# Patient Record
Sex: MALE | Race: WHITE | NOT HISPANIC OR LATINO | Employment: FULL TIME | URBAN - NONMETROPOLITAN AREA
[De-identification: names, ages, dates, MRNs, and addresses within clinical notes are randomized per-mention and may not be internally consistent; named-entity substitution may affect disease eponyms.]

---

## 2017-04-25 ENCOUNTER — HOSPITAL ENCOUNTER (EMERGENCY)
Facility: HOSPITAL | Age: 37
Discharge: HOME | End: 2017-04-25
Admitting: EMERGENCY MEDICINE

## 2017-04-25 VITALS
SYSTOLIC BLOOD PRESSURE: 123 MMHG | WEIGHT: 251 LBS | BODY MASS INDEX: 32.21 KG/M2 | RESPIRATION RATE: 16 BRPM | DIASTOLIC BLOOD PRESSURE: 83 MMHG | TEMPERATURE: 98 F | OXYGEN SATURATION: 97 % | HEIGHT: 74 IN | HEART RATE: 81 BPM

## 2017-04-25 DIAGNOSIS — I10 ESSENTIAL HYPERTENSION: Primary | ICD-10-CM

## 2017-04-25 PROCEDURE — 99282 EMERGENCY DEPT VISIT SF MDM: CPT

## 2017-04-25 RX ORDER — ASPIRIN 81 MG/1
81 TABLET, CHEWABLE ORAL DAILY
COMMUNITY

## 2017-04-25 RX ORDER — LISINOPRIL 10 MG/1
10 TABLET ORAL DAILY
Qty: 10 TABLET | Refills: 0 | Status: SHIPPED | OUTPATIENT
Start: 2017-04-25 | End: 2017-05-05

## 2017-04-25 NOTE — ED PROVIDER NOTES
Subjective   HPI Comments: Patient is a 36-year-old male who presents ER today with complaint of high blood pressure.  Patient reports that he lost his insurance proximally one half years ago and has not been on his blood pressure medicines for approximately 3 months.  He states he was able to stretch out his medications to last him a while however he has not been on anything for the past several months.  The patient reports that he has not taken his blood pressure with past several months however he has had a headache for about 2 weeks has been jawline nature.  He states this was similar to his headaches when he had high blood pressure.  Patient denies any head trauma, he denies any visual changes.  He denies any thunderclap headache or this being the worst headache of his life.  The patient also reports that he has been feeling short of breath immediately after he wakes up from sleep for the past several months as well since.  His blood pressure medicine.  He denies any chest pain with this.  Denies any shortness of breath at this time.  He states he would like a refill of a blood pressure medication.  He was previously on Benicar however he states he is not able to afford this and is it is several $100 a month for him.  He presents with her spouse today for further evaluation.    Patient is a 36 y.o. male presenting with hypertension.   History provided by:  Patient   used: No    Hypertension   Severity:  Mild  Onset quality:  Gradual  Duration:  3 months  Timing:  Constant  Progression:  Unchanged  Chronicity:  Chronic  Time since last dose of antihypertensive:  3 months  Notable PTA blood pressures:  Unknown  Context: normal sodium, not caffeine, not drug abuse, not herbal remedies, not medication change, not noncompliance, not OTC medications used and not stress    Relieved by:  Angiotensin blockers  Worsened by:  Nothing  Ineffective treatments:  None tried  Associated symptoms: no  abdominal pain, no anxiety, no blurred vision, no chest pain, no confusion, no dizziness, no ear pain, no epistaxis, no fatigue, no fever, no headaches, no hematuria, no hypokalemia, no loss of consciousness, no nausea, no neck pain, no palpitations, no peripheral edema, no shortness of breath, no syncope, no tinnitus, not vomiting and no weakness    Risk factors: no alcohol use, no cardiac disease, no cocaine use, no decongestant use, no diabetes, no family history of hypertension, no kidney disease, no obesity, no prior aneurysm, no prior stroke, no PVD and no tobacco use        Review of Systems   Constitutional: Negative for fatigue and fever.   HENT: Negative for ear pain, nosebleeds and tinnitus.    Eyes: Negative for blurred vision.   Respiratory: Negative for shortness of breath.    Cardiovascular: Negative for chest pain, palpitations and syncope.   Gastrointestinal: Negative for abdominal pain, nausea and vomiting.   Genitourinary: Negative for hematuria.   Musculoskeletal: Negative for neck pain.   Neurological: Negative for dizziness, loss of consciousness, weakness and headaches.   Psychiatric/Behavioral: Negative for confusion. The patient is not nervous/anxious.    All other systems reviewed and are negative.      Past Medical History:   Diagnosis Date   • Hypertension        No Known Allergies    Past Surgical History:   Procedure Laterality Date   • APPENDECTOMY         History reviewed. No pertinent family history.    Social History     Social History   • Marital status: Single     Spouse name: N/A   • Number of children: N/A   • Years of education: N/A     Social History Main Topics   • Smoking status: Former Smoker     Quit date: 4/25/2015   • Smokeless tobacco: None   • Alcohol use No   • Drug use: No   • Sexual activity: Not Asked     Other Topics Concern   • None     Social History Narrative   • None           Objective   Physical Exam   Constitutional: He is oriented to person, place, and  time. He appears well-developed and well-nourished.   HENT:   Head: Normocephalic and atraumatic.   Eyes: Conjunctivae are normal. Pupils are equal, round, and reactive to light.   Neck: Normal range of motion. Neck supple.   Cardiovascular: Normal rate, regular rhythm and normal heart sounds.    Pulmonary/Chest: Effort normal and breath sounds normal.   Abdominal: Soft. Bowel sounds are normal.   Musculoskeletal: Normal range of motion.   Neurological: He is alert and oriented to person, place, and time.   Skin: Skin is warm and dry.   Psychiatric: He has a normal mood and affect.   Nursing note and vitals reviewed.      Procedures         ED Course  ED Course   Comment By Time   At this time we will go ahead and start patient on lisinopril.  Advised that he will need to follow-up with a primary care provider for further management of this.  CMP, the ER  has met with the patient has provided the patient was to primary care providers that he can contact.  At this time patient will be discharged home in stable condition.  He declines any testing today for his headache for for his shortness of breath.  He states he just wants prescription for blood pressure medication.  At this time he will be discharged home in stable condition.  Asked return to the ER if any new or worsening symptoms NERI Cabrera 04/25 1058                  MDM  Number of Diagnoses or Management Options  Essential hypertension: new and requires workup  Patient Progress  Patient progress: stable      Final diagnoses:   Essential hypertension            NERI Cabrera  04/25/17 1100

## 2018-03-21 ENCOUNTER — HOSPITAL ENCOUNTER (OUTPATIENT)
Dept: HOSPITAL 58 - RAD | Age: 38
Discharge: HOME | End: 2018-03-21
Attending: INTERNAL MEDICINE

## 2018-03-21 DIAGNOSIS — N52.8: ICD-10-CM

## 2018-03-21 DIAGNOSIS — N50.819: Primary | ICD-10-CM

## 2018-03-21 DIAGNOSIS — I10: ICD-10-CM

## 2018-03-21 PROCEDURE — 80053 COMPREHEN METABOLIC PANEL: CPT

## 2018-03-21 PROCEDURE — 85025 COMPLETE CBC W/AUTO DIFF WBC: CPT

## 2018-03-21 PROCEDURE — 36415 COLL VENOUS BLD VENIPUNCTURE: CPT

## 2018-03-22 NOTE — US
Exam:  Gray-scale and color Doppler ultrasonographic evaluation of the testicles and scrotum. 

  

Comparison:  None available. 

  

Reason for exam:  Testicular pain. 

  

FINDINGS:  The right testicle measures approximately 4.93 x 2.06 x 2.62 cm with a normal appearing ec
hotexture and normal vascular flow.  No parenchymal mass lesion is seen in the right testicle. 

  

The right epididymis appears grossly unremarkable.  The right epididymal head measures 0.62 cm 

  

The right scrotal wall measures 0.68 cm. 

  

There is a small right-sided hydrocele. 

  

The left testicle measures approximately 3.62 x 1.91 x 3.16 cm with normal appearing echotexture and 
normal vascular flow.  No parenchymal mass lesion is seen in the left testicle. 

  

The left epididymal head measures 1.46 cm with a 0.2 cm epididymal cyst. 

  

The left scrotal wall measures 0.58 cm. 

  

There is a small left-sided hydrocele. 

  

There is minimally increased vascularity seen in the left epididymis when compared to the right epidi
dymis. 

  

Impression: 

1.  No evidence of testicular torsion or parenchymal mass lesion is seen in either testicle. 

2.  The left epididymal head is mildly prominent with slightly increased vascularity when compared to
 the right.  Imaging findings may represent normal physiologic differences versus mild left sided epi
didymitis. 

3.  Left epididymal cyst measuring 0.2 cm. 

4.  Small bilateral hydroceles.

## 2018-04-21 ENCOUNTER — HOSPITAL ENCOUNTER (EMERGENCY)
Dept: HOSPITAL 58 - ED | Age: 38
LOS: 1 days | Discharge: HOME | End: 2018-04-22

## 2018-04-21 VITALS — DIASTOLIC BLOOD PRESSURE: 81 MMHG | SYSTOLIC BLOOD PRESSURE: 130 MMHG | TEMPERATURE: 97.1 F

## 2018-04-21 VITALS — BODY MASS INDEX: 33 KG/M2

## 2018-04-21 DIAGNOSIS — I10: ICD-10-CM

## 2018-04-21 DIAGNOSIS — R00.2: Primary | ICD-10-CM

## 2018-04-21 DIAGNOSIS — R06.02: ICD-10-CM

## 2018-04-21 DIAGNOSIS — R53.1: ICD-10-CM

## 2018-04-21 PROCEDURE — 82550 ASSAY OF CK (CPK): CPT

## 2018-04-21 PROCEDURE — 84484 ASSAY OF TROPONIN QUANT: CPT

## 2018-04-21 PROCEDURE — 81001 URINALYSIS AUTO W/SCOPE: CPT

## 2018-04-21 PROCEDURE — 93010 ELECTROCARDIOGRAM REPORT: CPT

## 2018-04-21 PROCEDURE — 87086 URINE CULTURE/COLONY COUNT: CPT

## 2018-04-21 PROCEDURE — 84443 ASSAY THYROID STIM HORMONE: CPT

## 2018-04-21 PROCEDURE — 85379 FIBRIN DEGRADATION QUANT: CPT

## 2018-04-21 PROCEDURE — 80306 DRUG TEST PRSMV INSTRMNT: CPT

## 2018-04-21 PROCEDURE — 82553 CREATINE MB FRACTION: CPT

## 2018-04-21 PROCEDURE — 93005 ELECTROCARDIOGRAM TRACING: CPT

## 2018-04-21 PROCEDURE — 80053 COMPREHEN METABOLIC PANEL: CPT

## 2018-04-21 PROCEDURE — 99283 EMERGENCY DEPT VISIT LOW MDM: CPT

## 2018-04-21 PROCEDURE — 85025 COMPLETE CBC W/AUTO DIFF WBC: CPT

## 2018-04-21 PROCEDURE — 36415 COLL VENOUS BLD VENIPUNCTURE: CPT

## 2018-04-21 NOTE — ED.PDOC
General


ED Provider: 


Dr. ROMY JASSO





Chief Complaint: Palpitations


Stated Complaint: Been having palpitations on and off, they just come  and go. 

no chest pain Has shortness of breath.


Time Seen by Physician: 23:28


Mode of Arrival: Walk-In


Information Source: Patient


Primary Care Provider: 


ROMY JASSO-[facility]





Nursing and Triage Documentation Reviewed and Agree: Yes


Reviewed sepsis parameters & appropriate labs ordered?: No


System Inflammatory Response Syndrome: Not Applicable


Sepsis Protocol: 


For patient's 13 years and over:





Temp is 96.8 and below  and greater


Pulse >90 BPM


Resp >20/minute


Acutely Altered Mental Status





Are patient's symptoms suggestive of a new infection, such as:


   -Pneumonia


   -Skin, Soft Tissue


   -Endocarditis


   -UTI


   -Bone, Joint Infection


   -Implantable Device


   -Acute Abdominal Infection


   -Wound Infection


   -Meningitis


   -Blood Stream Catheter Infection


   -Unknown








Cardiovascular Complaint Exam





- Palpitations Complaint/Exam


Symptoms Are: Still present


Timing: Constant


Initial Severity: Moderate


Character: Reports: Fast, Pounding


Aggravating: Reports: Rest


Alleviating: Reports: None


Associated Signs and Symptoms: Reports: Shortness of breath.  Denies: 

Lightheadedness, Dizziness, Syncope, Chest pain, Diaphoresis, Nausea, Vomiting


Related History: Similar episode


Related Surgical History: Reports: None


Cardiac Risk Factors: Reports: Hypertension, Family history


Pulmonary Embolism Risk Factors: Reports: None


Atrial Fibrillation Risk Factors: Reports: None


Thyroid Exam: Normal


Differential Diagnoses: Medication induced, Panic Disorder, Hyperventilation


Quality Indicators for AMI: EKG in 10min.





Review of Systems





- Review Of Systems


Constitutional: Reports: Malaise, Weakness


Eyes: Reports: No symptoms


Ears, Nose, Mouth, Throat: Reports: No symptoms


Respiratory: Reports: Short of air


Cardiac: Reports: Palpitations


GI: Reports: No symptoms


: Reports: No symptoms


Musculoskeletal: Reports: No symptoms


Skin: Reports: No symptoms


Neurological: Reports: No symptoms


Endocrine: Reports: No symptoms


Hematologic/Lymphatic: Reports: No symptoms


All Other Systems: Reviewed and Negative





Past Medical History





- Past Medical History


Previously Healthy: Yes


Endocrine: Reports: None


Cardiovascular: Reports: Hypertension


Respiratory: Reports: None


Hematological: Reports: None


Gastrointestinal: Reports: None


Genitourinary: Reports: None


Neuro/Psych: Reports: Anxiety


Musculoskeletal: Reports: None


Cancer: Reports: None





- Surgical History


General Surgical History: Reports: None





- Family History


Family History: Reports: None





- Social History


Smoking Status: Former smoker


Hx Substance Use: No


Alcohol Screening: None





- Immunizations


Tetanus Shot up to Date: Yes





Physical Exam





- Physical Exam


Appearance: Well-appearing, No pain distress, Well-nourished


Eyes: JA, EOMI, Conjunctiva clear


ENT: Ears normal, Nose normal, Oropharynx normal


Respiratory: Airway patent, Breath sounds clear, Breath sounds equal, 

Respirations nonlabored


Cardiovascular: RRR, Pulses normal, No rub, No murmur


GI/: Soft, Nontender, No masses, Bowel sounds normal, No Organomegaly


Musculoskeletal: Normal strength, ROM intact, No edema, No calf tenderness


Skin: Warm, Dry, Normal color


Neurological: Sensation intact, Motor intact, Reflexes intact, Cranial nerves 

intact, Alert, Oriented


Psychiatric: Affect appropriate, Mood appropriate





Interpretation





- EKG Interpretation


Time of EKG #1: 23:45


Rate: Normal


Rhythm: Sinus


Ectopy: None


Axis: NL





Critical Care Note





- Critical Care Note


Total Time (mins): 30





Course





- Course


Hematology/Chemistry: 


 04/21/18 23:30





Orders, Labs, Meds: 


Lab Review











  04/21/18





  23:30


 


WBC  9.24


 


RBC  4.25 L


 


Hgb  12.9 L


 


Hct  36.6 L


 


MCV  86.1


 


MCH  30.4


 


MCHC  35.2


 


RDW Coeff of José Miguel  11.8


 


Plt Count  210


 


Immature Gran % (Auto)  0.3


 


Neut % (Auto)  64.9


 


Lymph % (Auto)  23.2


 


Mono % (Auto)  9.0


 


Eos % (Auto)  1.8


 


Baso % (Auto)  0.8


 


Immature Gran # (Auto)  0.0


 


Neut # (Auto)  6.0


 


Lymph # (Auto)  2.1


 


Mono # (Auto)  0.8


 


Eos # (Auto)  0.2


 


Baso # (Auto)  0.1








Orders











 Category Date Time Status


 


 EKG-(ED ONLY) Stat CARDIO  04/21/18 23:21 Ordered


 


 CBC W/ AUTO DIFF Stat LAB  04/21/18 23:30 Completed


 


 COMPREHENSIVE METABOLIC PANEL Stat LAB  04/21/18 23:30 Received


 


 CREATINE KINASE Stat LAB  04/21/18 23:30 Received


 


 THYROID STIMULATING HORMONE Stat LAB  04/21/18 23:30 Received


 


 TROPONIN I Stat LAB  04/21/18 23:30 Received


 


 URINALYSIS C & S IF INDICATED Stat LAB  04/21/18 23:50 Received


 


 URINE DRUG SCREEN (RAPID FOR ED) [DRUG SCREEN, URINE, LAB  04/21/18 23:50 

Received





 RAPID] Stat   











Vital Signs: 


 











  Temp Pulse Resp BP Pulse Ox


 


 04/21/18 23:05  97.1 F L  78  16  130/81  94 L














LEONID Risk Score


LEONID Risk Score: 


Risk Score      Odds of death by 30D


      0                 0.1 (0.1-0.2)


      1                 0.3 (0.2-0.3)


      2                 0.4 (0.3-0.5)


      3                 0.7 (0.6-0.9)


      4                 1.2 (1.0-1.5)


      5                 2.2 (1.9-2.6)


      6                 3.0 (2.5-3.6)


      7                 4.8 (3.8-6.1)








Departure





- Departure


Time of Disposition: 23:31


Disposition: HOME SELF-CARE


Discharge Problem: 


 Palpitations





Instructions:  Heart Palpitations (ED)


Condition: Stable


Pt referred to PMD for follow-up: Yes


IPMP verified?: No


Additional Instructions: 


No spicy food, No fried food


continue taking medications.


Needs f/u for further evaluation


f/u in [facility] in 3 days








Prescriptions: 


Ranitidine HCl [Zantac] 150 mg PO BIDAC #20 tablet


Sucralfate Susp [Carafate] 1 gm PO ACHS #1 bottle


Allergies/Adverse Reactions: 


Allergies





No Known Allergies Allergy (Verified 04/21/18 23:13)


 








Home Medications: 


Ambulatory Orders





Ranitidine HCl [Zantac] 150 mg PO BIDAC #20 tablet 04/21/18 


Sucralfate Susp [Carafate] 1 gm PO ACHS #1 bottle 04/21/18 








Disposition Discussed With: Patient

## 2018-04-22 NOTE — DI
EXAM:  Two-view chest 

  

HISTORY:  Palpitations 

  

COMPARISON: There are CT scan abdomen pelvis 01/10/2013 

  

FINDINGS:  The cardiomediastinal silhouette is normal.  There is 1.9 cm right lower lobe pulmonary no
dule previously seen on CT.  The left lung is clear.  No osseous abnormalities identified. 

  

IMPRESSION: 

  

No evidence of active pulmonary disease. 

  

Redemonstrated is a right lower lobe pulmonary nodule which merits continued follow-up

## 2018-05-08 ENCOUNTER — HOSPITAL ENCOUNTER (OUTPATIENT)
Dept: HOSPITAL 58 - CAR | Age: 38
Discharge: HOME | End: 2018-05-08
Attending: INTERNAL MEDICINE

## 2018-05-08 VITALS — BODY MASS INDEX: 33 KG/M2

## 2018-05-08 DIAGNOSIS — R06.02: ICD-10-CM

## 2018-05-08 DIAGNOSIS — R07.2: Primary | ICD-10-CM

## 2018-05-10 ENCOUNTER — HOSPITAL ENCOUNTER (OUTPATIENT)
Dept: HOSPITAL 58 - CAR | Age: 38
Discharge: HOME | End: 2018-05-10
Attending: INTERNAL MEDICINE

## 2018-05-10 VITALS — BODY MASS INDEX: 33 KG/M2

## 2018-05-10 DIAGNOSIS — R06.02: Primary | ICD-10-CM

## 2018-05-10 DIAGNOSIS — R07.2: ICD-10-CM

## 2018-05-10 NOTE — STRESS
Date of Test:  5/10/18      Ordering Physician: DR. ROMY GIMENEZ      Occupation:


Reason for Exam: SOB, CHEST PAIN  Smoking History: QUIT 3 YRS AGO  Height: 74" 
        Weight: 260 LBS

Current Medications: LISINOPRIL, RANITIDINE                           

Resting EKG: SINUS RHYTHM/ NO ACUTE CHANGES          Target Heart Rate: 154/182

                                                                               
                                                                        S-T 
SEGMENT





STAGE MPH/GRADE HEART RATE BPM BLOOD PRESSURE MMHG RHYTHM  +/-  ELEVATION 
DEPRESSION SYMPTOMS,COMMENTS

 

AT REST  67 128/70 SR X              NO SYMPTOMS

 

1 1.7/10% 100 142/72 SR X              NO SYMPTOMS

 

2 2.5/12% 122 140/78 SR X              NO SYMPTOMS

 

3 3.4/14% 140 150/80 SR X              NO SYMPTOMS

 

4 4.2/16%                  

 

5 5.0/18%                  

 

Immediately After 

 155  SR X              SHORT OF AIR

 

Minutes Post Exercise 5:00 80 122/68 SR X              NO SYMPTOMS

 



Minutes Post Exercise 

 

 

 

 

 

           

       





 DURATION OF EXERCISE: 9:23               MAXIMUM HEART  RATE REACHED: 155    
REASON FOR TERMINATION: SHORT OF AIR



98% OXYGEN SATURATION WITH EXERCISE ON ROOM AIR                 

INTERPRETATION:

1: NO EVIDENCE OF ISCHEMIA BY ST-T WAVE

2: NO CHEST PAIN OR CHEST DISCOMFORT

3: BLOOD PRESSURE RESPONSE: NORMAL 

4. NO ARRHYTHMIAS

TRACEY LEFT VENTRICULAR CONTRACTILITY--RESTING AND POST EXERCISE

                                                                               
            

MTDD

## 2018-05-10 NOTE — STRESSECHO
Date of Test:  5/10/18    Ordering Physician: DR. ROMY JASSO             
Occupation:

Reason for Exam: CHEST PAIN, SOB       Smoking History: QUIT 3 YRS AGO  Height: 
74"         Weight: 260 LBS

Current Medications: LISINOPRIL, RANITIDINE, CARAFATE                           

Resting EKG: SINUS RHYTHM/ NO ACUTE CHANGES      Target Heart Rate: 154/182

                                                                               
                                                                        S-T 
SEGMENT





STAGE MPH/GRADE HEART RATE BPM BLOOD PRESSURE MMHG RHYTHM  +/-  ELEVATION 
DEPRESSION SYMPTOMS,COMMENTS

 

AT REST  67 128/70 SR X              NO SYMPTOMS

 

1 1.7/10% 100 142/82 SR X              NO SYMPTOMS

 

2 2.5/12% 122 140/78 SR X              NO SYMPTOMS

 

3 3.4/14% 140 150/82 SR X              NO SYMPTOMS

 

4 4.2/16%                  

 

5 5.0/18%                  

 

Immediately After 

 155  SR X              SHORT OF AIR

 

Minutes Post Exercise 5:00 80  122/68 SR X              SHORT OF AIR

 



Minutes Post Exercise 

 

 

 

 

 

           

       





 DURATION OF EXERCISE: 9:23         MAXIMUM HEART  RATE REACHED: 80          
REASON FOR TERMINATION: SHORT OF AIR



98% OXYGEN SATURATION WITH EXERCISE ON ROOM AIR                                
                                                                          

  INTERPRETATION: 

1. NO EVIDENCE OF ISCHEMIA B ST-T WAVE 

2. NO CHEST PAIN OR CHEST DISCOMFORT

3. BLOOD PRESSURE RESPONSE: NORMAL

4. NO ARRHYTHMIAS

NORMAL LEFT VENTRICULAR CONTRACTILITY--RESTING AND POST EXERCISE



MTDD

## 2018-05-11 ENCOUNTER — HOSPITAL ENCOUNTER (OUTPATIENT)
Dept: HOSPITAL 58 - CAR | Age: 38
Discharge: HOME | End: 2018-05-11
Attending: INTERNAL MEDICINE

## 2018-05-11 VITALS — BODY MASS INDEX: 33 KG/M2

## 2018-05-11 DIAGNOSIS — R00.2: Primary | ICD-10-CM

## 2018-05-11 PROCEDURE — 93227 XTRNL ECG REC<48 HR R&I: CPT

## 2018-05-14 NOTE — HOLTER
PATIENT INFORMATION AND COMMENTS

Attending Physician: DR. ROMY JASSO



Indications:  PALPITATIONS



________________________________________________________________________________
__

Patient Medications:  LISINOPRIL, RANITIDINE,  CARAFATE



________________________________________________________________________________
__

Pre-procedure Summary: 



Protocol:  Standard      Heart Rate         

Started: 5/11/18 1317      Minimum: 54 BPM   Weight: 260 LBS   

Ended: 5/12/18 1317      Maximum: 128 BPM   Height: 74"

Duration: 24 HOURS      Average:  70 BPM

________________________________________________________________________________
_

INTERPRETATIONS/OBSERVATIONS:

1. BASIC RHYTHM: SINUS, RATE 54 BPM  BPM

2. RARE PAC'S AND PVC'S

3. NO ST-T WAVE CHANGES FROM BASELINE

4. NO CORRELATION WITH ACTIVITY LOG





MTDD

## 2019-01-11 ENCOUNTER — HOSPITAL ENCOUNTER (OUTPATIENT)
Dept: HOSPITAL 58 - RHC-LAB | Age: 39
Discharge: HOME | End: 2019-01-11
Attending: NURSE PRACTITIONER

## 2019-01-11 VITALS — BODY MASS INDEX: 33 KG/M2

## 2019-01-11 DIAGNOSIS — E78.5: ICD-10-CM

## 2019-01-11 DIAGNOSIS — I10: Primary | ICD-10-CM

## 2019-01-11 PROCEDURE — 36415 COLL VENOUS BLD VENIPUNCTURE: CPT

## 2019-01-11 PROCEDURE — 85025 COMPLETE CBC W/AUTO DIFF WBC: CPT

## 2019-01-11 PROCEDURE — 80053 COMPREHEN METABOLIC PANEL: CPT

## 2019-01-11 PROCEDURE — 80061 LIPID PANEL: CPT

## 2019-02-04 ENCOUNTER — HOSPITAL ENCOUNTER (EMERGENCY)
Dept: HOSPITAL 58 - ED | Age: 39
Discharge: HOME | End: 2019-02-04
Payer: COMMERCIAL

## 2019-02-04 VITALS — SYSTOLIC BLOOD PRESSURE: 136 MMHG | DIASTOLIC BLOOD PRESSURE: 79 MMHG | TEMPERATURE: 97.5 F

## 2019-02-04 VITALS — BODY MASS INDEX: 33.3 KG/M2

## 2019-02-04 DIAGNOSIS — R42: ICD-10-CM

## 2019-02-04 DIAGNOSIS — I10: ICD-10-CM

## 2019-02-04 DIAGNOSIS — E16.2: Primary | ICD-10-CM

## 2019-02-04 PROCEDURE — 82550 ASSAY OF CK (CPK): CPT

## 2019-02-04 PROCEDURE — 84484 ASSAY OF TROPONIN QUANT: CPT

## 2019-02-04 PROCEDURE — 85025 COMPLETE CBC W/AUTO DIFF WBC: CPT

## 2019-02-04 PROCEDURE — 82553 CREATINE MB FRACTION: CPT

## 2019-02-04 PROCEDURE — 93010 ELECTROCARDIOGRAM REPORT: CPT

## 2019-02-04 PROCEDURE — 82962 GLUCOSE BLOOD TEST: CPT

## 2019-02-04 PROCEDURE — 80053 COMPREHEN METABOLIC PANEL: CPT

## 2019-02-04 PROCEDURE — 99283 EMERGENCY DEPT VISIT LOW MDM: CPT

## 2019-02-04 PROCEDURE — 93005 ELECTROCARDIOGRAM TRACING: CPT

## 2019-02-04 PROCEDURE — 81001 URINALYSIS AUTO W/SCOPE: CPT

## 2019-02-04 PROCEDURE — 83036 HEMOGLOBIN GLYCOSYLATED A1C: CPT

## 2019-02-04 PROCEDURE — 36415 COLL VENOUS BLD VENIPUNCTURE: CPT

## 2019-02-04 NOTE — CT
EXAM:  CT scan of the head without contrast 

  

  

HISTORY:  Headaches, nausea 

  

  

TECHNIQUE:  Helical imaging of the head was performed without contrast.  5 mm thin axial images and c
oronal and sagittal images were provided for interpretation. 

  

  

FINDINGS:  The gray-white interface appears normal.  No acute hemorrhages are seen.  There is no mass
 effect.  The basal cisterns are patent.  The paranasal sinuses and mastoid air cells are clear.  The
 calvarium and extracranial soft tissues are normal. 

  

  

IMPRESSION:  No acute intracranial abnormalities are seen.

## 2019-02-04 NOTE — ED.PDOC
General


ED Provider: 


Dr. JERARDO STUART-ER





Chief Complaint: Dizziness


Stated Complaint: i think my bs gets low---i get shaky aNd nauseated and feel 

better when i eat


Time Seen by Physician: 19:30


Mode of Arrival: Walk-In


Information Source: Patient


Exam Limitations: No limitations


Primary Care Provider: 


NARINDER LEGGETT





Nursing and Triage Documentation Reviewed and Agree: Yes


Does patient meet sepsis criteria?: No


System Inflammatory Response Syndrome: Not Applicable


Sepsis Protocol: 


For patient's 13 years and over:





Temp is 96.8 and below  and greater


Pulse >90 BPM


Resp >20/minute


Acutely Altered Mental Status





Are patient's symptoms suggestive of a new infection, such as:


   -Pneumonia


   -Skin, Soft Tissue


   -Endocarditis


   -UTI


   -Bone, Joint Infection


   -Implantable Device


   -Acute Abdominal Infection


   -Wound Infection


   -Meningitis


   -Blood Stream Catheter Infection


   -Unknown








Endocrine Complaint Exam





- Diabetic Complication Complaint/Exam


Onset/Duration: 30 min


Symptoms Are: Resolved


Timing: Intermittent


Initial Severity: Mild


Current Severity: Mild


Character: Lethargic


Aggravating: Reports: Diet change


Alleviating: Reports: Food


Associated Signs and Symptoms: Denies: Decreased LOC, Polydipsia, Polyuria, 

Polyphagia, Weight loss, Abdominal pain, Nausea, Vomiting, Fever, Diaphoresis, 

Fruity breath


Acetone on Breath: No


Dry Mucous Membranes: No


Kussmaul Respirations: No


Glascow Coma Scale (see protocol): 15


Meningeal Signs: No


Focal Weakness: None


Focal Sensory Loss: None


Gait: Normal


Nystagmus Present: No


Gag Reflex Present: Yes


Finger to Nose: Normal


Romberg Test Positive: No


Babinski Sign: Negative Right, Negative Left


Heel to Toe Normal: Yes


Differential Diagnoses: Hypoglycemia





Review of Systems





- Review Of Systems


Constitutional: Reports: No symptoms


Eyes: Reports: No symptoms


Ears, Nose, Mouth, Throat: Reports: No symptoms


Respiratory: Reports: No symptoms


Cardiac: Reports: No symptoms


GI: Reports: No symptoms


: Reports: No symptoms


Musculoskeletal: Reports: No symptoms, Muscle pain


Skin: Reports: No symptoms


Neurological: Reports: No symptoms


Endocrine: Reports: No symptoms


Hematologic/Lymphatic: Reports: No symptoms


All Other Systems: Reviewed and Negative





Past Medical History





- Past Medical History


Previously Healthy: Yes


Endocrine: Reports: None


Cardiovascular: Reports: Hypertension


Respiratory: Reports: None


Hematological: Reports: None


Gastrointestinal: Reports: None


Genitourinary: Reports: None


Neuro/Psych: Reports: Anxiety


Musculoskeletal: Reports: None


Cancer: Reports: None





- Surgical History


General Surgical History: Reports: None





- Family History


Family History: Reports: None





- Social History


Smoking Status: Former smoker


Hx Substance Use: No


Alcohol Screening: None





- Immunizations


Tetanus Shot up to Date: No





Physical Exam





- Physical Exam


Appearance: Well-appearing, No pain distress, Well-nourished


Eyes: JA, EOMI, Conjunctiva clear


ENT: Ears normal, Nose normal, Oropharynx normal


Neck: Supple


Respiratory: Airway patent, Breath sounds clear, Breath sounds equal, 

Respirations nonlabored


Cardiovascular: RRR


GI/: Soft


Musculoskeletal: Normal strength, ROM intact, No edema, No calf tenderness


Skin: Warm, Dry, Normal color


Neurological: Sensation intact, Motor intact, Reflexes intact, Cranial nerves 

intact, Alert, Oriented


Psychiatric: Affect appropriate, Mood appropriate





Interpretation





- Radiology Interpretation


Radiology Interpretation By: Radiologist


Radiology Results: Negative


Exam Interpreted: CT Scan





- EKG Interpretation


Time of EKG #1: 20:26


Rate: Normal


Rhythm: Sinus


Ectopy: None


Axis: NL


ST Segment: Normal


Interpretation: nsr





Critical Care Note





- Critical Care Note


Total Time (mins): 0





Course





- Course


Hematology/Chemistry: 


 02/04/19 19:46





 02/04/19 19:46


Orders, Labs, Meds: 





Lab Review











  02/04/19 02/04/19 02/04/19





  19:37 19:46 19:46


 


WBC   8.03 


 


RBC   4.67 L 


 


Hgb   13.9 L 


 


Hct   39.7 L 


 


MCV   85.0 


 


MCH   29.8 


 


MCHC   35.0 


 


RDW Coeff of José Miguel   11.8 


 


Plt Count   216 


 


Immature Gran % (Auto)   0.2 


 


Neut % (Auto)   66.3 


 


Lymph % (Auto)   24.3 


 


Mono % (Auto)   7.2 


 


Eos % (Auto)   1.1 


 


Baso % (Auto)   0.9 


 


Immature Gran # (Auto)   0.0 


 


Neut # (Auto)   5.3 


 


Lymph # (Auto)   2.0 


 


Mono # (Auto)   0.6 


 


Eos # (Auto)   0.1 


 


Baso # (Auto)   0.1 


 


Sodium    140.6


 


Potassium    4.04


 


Chloride    102.0


 


Carbon Dioxide    26.0


 


Anion Gap    16.64


 


BUN    17.6


 


Creatinine    1.13 H


 


Estimated GFR (MDRD)    72.00


 


BUN/Creatinine Ratio    15.57


 


Glucose    131.1 H


 


Hemoglobin A1c   


 


Calcium    9.37


 


Total Bilirubin    0.63


 


AST    37.7


 


ALT    46.0


 


Alkaline Phosphatase    52.9


 


Total Creatine Kinase    132.0


 


CK-MB (CK-2)    0.556


 


CK-MB (CK-2) %    0.4200


 


Troponin I    < 0.012


 


Total Protein    7.99


 


Albumin    4.79


 


Globulin    3.20


 


Albumin/Globulin Ratio    1.49


 


Urine Color  Yellow  


 


Urine Clarity  Clear  


 


Urine pH  7.0  


 


Ur Specific Gravity  1.010  


 


Urine Protein  Negative  


 


Urine Glucose (UA)  Negative  


 


Urine Ketones  Negative  


 


Urine Blood  Negative  


 


Urine Nitrite  Negative  


 


Urine Bilirubin  Negative  


 


Urine Urobilinogen  0.2  


 


Ur Leukocyte Esterase  Negative  














  02/04/19





  19:46


 


WBC 


 


RBC 


 


Hgb 


 


Hct 


 


MCV 


 


MCH 


 


MCHC 


 


RDW Coeff of José Miguel 


 


Plt Count 


 


Immature Gran % (Auto) 


 


Neut % (Auto) 


 


Lymph % (Auto) 


 


Mono % (Auto) 


 


Eos % (Auto) 


 


Baso % (Auto) 


 


Immature Gran # (Auto) 


 


Neut # (Auto) 


 


Lymph # (Auto) 


 


Mono # (Auto) 


 


Eos # (Auto) 


 


Baso # (Auto) 


 


Sodium 


 


Potassium 


 


Chloride 


 


Carbon Dioxide 


 


Anion Gap 


 


BUN 


 


Creatinine 


 


Estimated GFR (MDRD) 


 


BUN/Creatinine Ratio 


 


Glucose 


 


Hemoglobin A1c  5.30


 


Calcium 


 


Total Bilirubin 


 


AST 


 


ALT 


 


Alkaline Phosphatase 


 


Total Creatine Kinase 


 


CK-MB (CK-2) 


 


CK-MB (CK-2) % 


 


Troponin I 


 


Total Protein 


 


Albumin 


 


Globulin 


 


Albumin/Globulin Ratio 


 


Urine Color 


 


Urine Clarity 


 


Urine pH 


 


Ur Specific Gravity 


 


Urine Protein 


 


Urine Glucose (UA) 


 


Urine Ketones 


 


Urine Blood 


 


Urine Nitrite 


 


Urine Bilirubin 


 


Urine Urobilinogen 


 


Ur Leukocyte Esterase 








Orders











 Category Date Time Status


 


 EKG-(ED ONLY) Stat CARDIO  02/04/19 19:33 Completed


 


 CBC W/ AUTO DIFF Stat LAB  02/04/19 19:46 Completed


 


 COMPREHENSIVE METABOLIC PANEL Stat LAB  02/04/19 19:46 Completed


 


 CREATINE KINASE Stat LAB  02/04/19 19:46 Completed


 


 HEMOGLOBIN A1C Stat LAB  02/04/19 19:46 Completed


 


 TROPONIN I Stat LAB  02/04/19 19:46 Completed


 


 URINALYSIS C & S IF INDICATED Stat LAB  02/04/19 19:37 Completed


 


 CT HEAD W/O CONTRAST Stat RADS  02/04/19 19:36 Completed











Vital Signs: 





 











  Temp Pulse Resp BP Pulse Ox


 


 02/04/19 19:28  97.5 F L  78  18  136/79  95














Departure





- Departure


Time of Disposition: 20:26


Disposition: HOME SELF-CARE


Discharge Problem: 


 Hypoglycemia





Instructions:  What to Do if Your Blood Sugar is Low (ED), Non-diabetic 

Hypoglycemia (ED)


Condition: Good


Pt referred to PMD for follow-up: Yes


IPMP verified?: No


Additional Instructions: 


talk to narinder aBout ordering gtt


Allergies/Adverse Reactions: 


Allergies





No Known Allergies Allergy (Verified 02/04/19 19:29)


 








Home Medications: 


Ambulatory Orders





Iron,Carbonyl/Ascorbic Acid [Iron 100-Vitamin C Tablet] 1 each PO DAILY 01/11/ 19 








Disposition Discussed With: Patient, Family

## 2021-09-05 ENCOUNTER — HOSPITAL ENCOUNTER (EMERGENCY)
Facility: HOSPITAL | Age: 41
Discharge: HOME | End: 2021-09-05
Admitting: EMERGENCY MEDICINE

## 2021-09-05 ENCOUNTER — NURSE TRIAGE (OUTPATIENT)
Dept: CALL CENTER | Facility: HOSPITAL | Age: 41
End: 2021-09-05

## 2021-09-05 ENCOUNTER — APPOINTMENT (OUTPATIENT)
Dept: CT IMAGING | Facility: HOSPITAL | Age: 41
End: 2021-09-05

## 2021-09-05 VITALS
DIASTOLIC BLOOD PRESSURE: 52 MMHG | HEART RATE: 71 BPM | BODY MASS INDEX: 30.29 KG/M2 | TEMPERATURE: 98.4 F | WEIGHT: 236 LBS | SYSTOLIC BLOOD PRESSURE: 122 MMHG | HEIGHT: 74 IN | RESPIRATION RATE: 18 BRPM | OXYGEN SATURATION: 98 %

## 2021-09-05 DIAGNOSIS — R10.12 LUQ ABDOMINAL PAIN: ICD-10-CM

## 2021-09-05 DIAGNOSIS — R16.1 SPLENOMEGALY: Primary | ICD-10-CM

## 2021-09-05 LAB
ALBUMIN SERPL-MCNC: 4.3 G/DL (ref 3.5–5.2)
ALBUMIN/GLOB SERPL: 1.4 G/DL
ALP SERPL-CCNC: 79 U/L (ref 39–117)
ALT SERPL W P-5'-P-CCNC: 21 U/L (ref 1–41)
AMYLASE SERPL-CCNC: 48 U/L (ref 28–100)
ANION GAP SERPL CALCULATED.3IONS-SCNC: 11 MMOL/L (ref 5–15)
AST SERPL-CCNC: 17 U/L (ref 1–40)
BASOPHILS # BLD AUTO: 0.03 10*3/MM3 (ref 0–0.2)
BASOPHILS NFR BLD AUTO: 0.4 % (ref 0–1.5)
BILIRUB SERPL-MCNC: 0.6 MG/DL (ref 0–1.2)
BILIRUB UR QL STRIP: NEGATIVE
BUN SERPL-MCNC: 11 MG/DL (ref 6–20)
BUN/CREAT SERPL: 10.9 (ref 7–25)
CALCIUM SPEC-SCNC: 9.1 MG/DL (ref 8.6–10.5)
CHLORIDE SERPL-SCNC: 105 MMOL/L (ref 98–107)
CLARITY UR: CLEAR
CO2 SERPL-SCNC: 25 MMOL/L (ref 22–29)
COLOR UR: YELLOW
CREAT SERPL-MCNC: 1.01 MG/DL (ref 0.76–1.27)
D-LACTATE SERPL-SCNC: 0.9 MMOL/L (ref 0.5–2)
DEPRECATED RDW RBC AUTO: 35.1 FL (ref 37–54)
EOSINOPHIL # BLD AUTO: 0.04 10*3/MM3 (ref 0–0.4)
EOSINOPHIL NFR BLD AUTO: 0.6 % (ref 0.3–6.2)
ERYTHROCYTE [DISTWIDTH] IN BLOOD BY AUTOMATED COUNT: 11.4 % (ref 12.3–15.4)
GFR SERPL CREATININE-BSD FRML MDRD: 81 ML/MIN/1.73
GLOBULIN UR ELPH-MCNC: 3.1 GM/DL
GLUCOSE SERPL-MCNC: 112 MG/DL (ref 65–99)
GLUCOSE UR STRIP-MCNC: NEGATIVE MG/DL
HCT VFR BLD AUTO: 39.6 % (ref 37.5–51)
HETEROPH AB SER QL LA: NEGATIVE
HGB BLD-MCNC: 13.9 G/DL (ref 13–17.7)
HGB UR QL STRIP.AUTO: NEGATIVE
IMM GRANULOCYTES # BLD AUTO: 0.03 10*3/MM3 (ref 0–0.05)
IMM GRANULOCYTES NFR BLD AUTO: 0.4 % (ref 0–0.5)
KETONES UR QL STRIP: NEGATIVE
LEUKOCYTE ESTERASE UR QL STRIP.AUTO: NEGATIVE
LIPASE SERPL-CCNC: 38 U/L (ref 13–60)
LYMPHOCYTES # BLD AUTO: 1.66 10*3/MM3 (ref 0.7–3.1)
LYMPHOCYTES NFR BLD AUTO: 23 % (ref 19.6–45.3)
MAGNESIUM SERPL-MCNC: 2.4 MG/DL (ref 1.6–2.6)
MCH RBC QN AUTO: 30 PG (ref 26.6–33)
MCHC RBC AUTO-ENTMCNC: 35.1 G/DL (ref 31.5–35.7)
MCV RBC AUTO: 85.3 FL (ref 79–97)
MONOCYTES # BLD AUTO: 0.82 10*3/MM3 (ref 0.1–0.9)
MONOCYTES NFR BLD AUTO: 11.4 % (ref 5–12)
NEUTROPHILS NFR BLD AUTO: 4.63 10*3/MM3 (ref 1.7–7)
NEUTROPHILS NFR BLD AUTO: 64.2 % (ref 42.7–76)
NITRITE UR QL STRIP: NEGATIVE
NRBC BLD AUTO-RTO: 0 /100 WBC (ref 0–0.2)
PH UR STRIP.AUTO: 8.5 [PH] (ref 5–8)
PLATELET # BLD AUTO: 235 10*3/MM3 (ref 140–450)
PMV BLD AUTO: 9.6 FL (ref 6–12)
POTASSIUM SERPL-SCNC: 4.3 MMOL/L (ref 3.5–5.2)
PROCALCITONIN SERPL-MCNC: 0.02 NG/ML (ref 0–0.25)
PROT SERPL-MCNC: 7.4 G/DL (ref 6–8.5)
PROT UR QL STRIP: NEGATIVE
RBC # BLD AUTO: 4.64 10*6/MM3 (ref 4.14–5.8)
SARS-COV-2 RNA PNL SPEC NAA+PROBE: NOT DETECTED
SODIUM SERPL-SCNC: 141 MMOL/L (ref 136–145)
SP GR UR STRIP: 1.01 (ref 1–1.03)
UROBILINOGEN UR QL STRIP: ABNORMAL
WBC # BLD AUTO: 7.21 10*3/MM3 (ref 3.4–10.8)

## 2021-09-05 PROCEDURE — 25010000002 IOPAMIDOL 61 % SOLUTION: Performed by: PHYSICIAN ASSISTANT

## 2021-09-05 PROCEDURE — 74177 CT ABD & PELVIS W/CONTRAST: CPT

## 2021-09-05 PROCEDURE — 99283 EMERGENCY DEPT VISIT LOW MDM: CPT

## 2021-09-05 PROCEDURE — 83735 ASSAY OF MAGNESIUM: CPT | Performed by: PHYSICIAN ASSISTANT

## 2021-09-05 PROCEDURE — 81003 URINALYSIS AUTO W/O SCOPE: CPT | Performed by: PHYSICIAN ASSISTANT

## 2021-09-05 PROCEDURE — 36415 COLL VENOUS BLD VENIPUNCTURE: CPT

## 2021-09-05 PROCEDURE — 82150 ASSAY OF AMYLASE: CPT | Performed by: PHYSICIAN ASSISTANT

## 2021-09-05 PROCEDURE — 84145 PROCALCITONIN (PCT): CPT | Performed by: PHYSICIAN ASSISTANT

## 2021-09-05 PROCEDURE — 83605 ASSAY OF LACTIC ACID: CPT | Performed by: PHYSICIAN ASSISTANT

## 2021-09-05 PROCEDURE — 25010000002 PROCHLORPERAZINE 10 MG/2ML SOLUTION: Performed by: PHYSICIAN ASSISTANT

## 2021-09-05 PROCEDURE — 85025 COMPLETE CBC W/AUTO DIFF WBC: CPT | Performed by: PHYSICIAN ASSISTANT

## 2021-09-05 PROCEDURE — 87635 SARS-COV-2 COVID-19 AMP PRB: CPT | Performed by: PHYSICIAN ASSISTANT

## 2021-09-05 PROCEDURE — 86308 HETEROPHILE ANTIBODY SCREEN: CPT | Performed by: PHYSICIAN ASSISTANT

## 2021-09-05 PROCEDURE — 87040 BLOOD CULTURE FOR BACTERIA: CPT | Performed by: PHYSICIAN ASSISTANT

## 2021-09-05 PROCEDURE — 80053 COMPREHEN METABOLIC PANEL: CPT | Performed by: PHYSICIAN ASSISTANT

## 2021-09-05 PROCEDURE — 96374 THER/PROPH/DIAG INJ IV PUSH: CPT

## 2021-09-05 PROCEDURE — 83690 ASSAY OF LIPASE: CPT | Performed by: PHYSICIAN ASSISTANT

## 2021-09-05 RX ORDER — LISINOPRIL 40 MG/1
40 TABLET ORAL DAILY
COMMUNITY

## 2021-09-05 RX ORDER — SODIUM CHLORIDE 0.9 % (FLUSH) 0.9 %
10 SYRINGE (ML) INJECTION AS NEEDED
Status: DISCONTINUED | OUTPATIENT
Start: 2021-09-05 | End: 2021-09-05 | Stop reason: HOSPADM

## 2021-09-05 RX ORDER — PROMETHAZINE HYDROCHLORIDE 12.5 MG/1
12.5 TABLET ORAL EVERY 6 HOURS PRN
Qty: 12 TABLET | Refills: 0 | Status: SHIPPED | OUTPATIENT
Start: 2021-09-05

## 2021-09-05 RX ORDER — PROCHLORPERAZINE EDISYLATE 5 MG/ML
10 INJECTION INTRAMUSCULAR; INTRAVENOUS ONCE
Status: COMPLETED | OUTPATIENT
Start: 2021-09-05 | End: 2021-09-05

## 2021-09-05 RX ADMIN — PROCHLORPERAZINE EDISYLATE 10 MG: 5 INJECTION INTRAMUSCULAR; INTRAVENOUS at 14:24

## 2021-09-05 RX ADMIN — IOPAMIDOL 100 ML: 612 INJECTION, SOLUTION INTRAVENOUS at 15:17

## 2021-09-05 RX ADMIN — SODIUM CHLORIDE, POTASSIUM CHLORIDE, SODIUM LACTATE AND CALCIUM CHLORIDE 1000 ML: 600; 310; 30; 20 INJECTION, SOLUTION INTRAVENOUS at 14:25

## 2021-09-05 NOTE — ED TRIAGE NOTES
Pt states he is 3 weeks post covid last weekend he started having LUQ abd with n/v. Pt denies diarrhea

## 2021-09-05 NOTE — ED PROVIDER NOTES
"Subjective   History of Present Illness    Patient is a 41-year-old male with PMH significant for hypertension and GERD presenting to ED with abdominal pain.  Surgical history positive for appendectomy.  Patient reports approximately 3 weeks ago he became \"symptomatic for Covid\" at which time he had very mild generalized abdominal pain.  Patient reports a week ago his Covid symptoms resolved however he has continued to have left upper quadrant pain with nausea and vomiting.  Patient states at the initiation of his Covid symptoms he had diarrhea but he has not had any in over a week.  Patient denies fevers, chills, diaphoresis, myalgias, flank pain, dysuria, hematuria.  Patient reports he is becoming weak due to the excessive nausea and vomiting at which time he presents for further evaluation.  Patient denies any alleviating or worsening factors including change in pain with food, vomiting, positions, or breath.  Patient denies any history of pancreatitis, gallstones, denies use of alcohol, does not take any diabetic medications including Metformin, and does not take any medications for high cholesterol/lipid/triglyceridemia.  Patient reports he has been trying Zofran and it is not affecting his symptoms at which time he presents for further evaluation.    Records reviewed show patient has not been seen in ED since 4/25/17 for hypertension.     Review of Systems   Constitutional: Positive for appetite change (decreased due to nausea). Negative for chills, diaphoresis and fever.   HENT: Negative.    Eyes: Negative.    Respiratory: Negative.    Cardiovascular: Negative.    Gastrointestinal: Positive for abdominal pain (LUQ), nausea and vomiting. Negative for diarrhea.   Genitourinary: Negative.  Negative for dysuria, flank pain and hematuria.   Musculoskeletal: Negative.    Skin: Negative.    Neurological: Positive for weakness (generalized).   Psychiatric/Behavioral: Negative.    All other systems reviewed and are " negative.      Past Medical History:   Diagnosis Date   • GERD (gastroesophageal reflux disease)    • Hypertension        No Known Allergies    Past Surgical History:   Procedure Laterality Date   • APPENDECTOMY         History reviewed. No pertinent family history.    Social History     Socioeconomic History   • Marital status: Single     Spouse name: Not on file   • Number of children: Not on file   • Years of education: Not on file   • Highest education level: Not on file   Tobacco Use   • Smoking status: Former Smoker     Quit date: 2015     Years since quittin.3   Substance and Sexual Activity   • Alcohol use: No   • Drug use: No           Objective   Physical Exam  Vitals and nursing note reviewed.   Constitutional:       General: He is not in acute distress.     Appearance: Normal appearance. He is well-developed, well-groomed and overweight. He is not toxic-appearing or diaphoretic.   HENT:      Head: Normocephalic.      Mouth/Throat:      Mouth: Mucous membranes are moist.      Pharynx: Oropharynx is clear. No posterior oropharyngeal erythema.   Eyes:      General: No scleral icterus.     Extraocular Movements: Extraocular movements intact.      Conjunctiva/sclera: Conjunctivae normal.      Pupils: Pupils are equal, round, and reactive to light.   Cardiovascular:      Rate and Rhythm: Normal rate and regular rhythm.   Pulmonary:      Effort: Pulmonary effort is normal. No respiratory distress.      Breath sounds: Normal breath sounds.   Abdominal:      General: Bowel sounds are normal.      Palpations: Abdomen is soft.      Tenderness: There is abdominal tenderness in the left upper quadrant. There is no right CVA tenderness, left CVA tenderness or guarding.   Musculoskeletal:         General: Normal range of motion.      Cervical back: Normal range of motion and neck supple.      Right lower leg: No edema.      Left lower leg: No edema.   Skin:     General: Skin is warm and dry.      Coloration:  Skin is not jaundiced or pale.   Neurological:      Mental Status: He is alert and oriented to person, place, and time.      Gait: Gait normal.   Psychiatric:         Attention and Perception: Attention normal.         Mood and Affect: Mood normal.         Speech: Speech normal.         Behavior: Behavior normal. Behavior is cooperative.         Procedures           ED Course                                           MDM  Number of Diagnoses or Management Options     Amount and/or Complexity of Data Reviewed  Clinical lab tests: ordered and reviewed  Tests in the radiology section of CPT®: reviewed and ordered  Tests in the medicine section of CPT®: reviewed and ordered  Decide to obtain previous medical records or to obtain history from someone other than the patient: yes  Review and summarize past medical records: yes  Discuss the patient with other providers: yes (Dr. Quincy Azar (attending))      Patient is a 41-year-old male with PMH significant for hypertension and GERD presenting to ED with abdominal pain.  Surgical history positive for appendectomy.  CBC with no acute findings.  CMP with hyperglycemia no other acute findings including normal LFTs.  Lipase normal at 38, amylase normal at 48.  Magnesium normal at 2.4.  Lactic acid normal 0.9.  Procalcitonin normal 0.02.  Covid testing negative.  Urinalysis with no evidence of infection or hematuria. COVID negative. Monospot negative. CT imaging of the abdomen and pelvis with IV contrast only shows: Mild splenomegaly, fat-containing umbilical and inguinal hernias, benign calcified 2 cm right lower lobe nodule, few groundglass opacities within lower lobe favored infectious/inflammatory process, low confidence of COVID-19 associated pneumonia, no acute inflammatory process within the abdomen or pelvis. Patient given a dose of compazine which improved his nausea with no further emesis and he was able to tolerate p.o. fluids without difficulty.  Patient was  given a fluid bolus and reported feeling better.  Discussed with patient need for continued PCP follow-up for monitoring of splenomegaly as well as reevaluation of his symptoms.  Advised of strict return precautions with no further questions concerns, needs at this time and patient is stable for discharge.  Case was discussed with Dr. Quincy Azar is in agreement no further recommendations.    MDM: The patient was evaluated for different concerning etiologies of abdominal pain including: cholecystitis, choledocolithiasis, pancreatitis but the patient did not have any right upper quadrant abdominal pain, no jaundice, no LFT or lipase changes. The patient did not have any right lower quadrant abdominal pain and is status post appendectomy. The patient is having normal BM with + flatus and no clinical signs of bowel obstruction or colitis. The patient's abdominal exam is benign and no signs of an acute or surgical abdomen. The patient denies dysuria/hematuria, UA is unremarkable and does not have and clinical signs of UTI, pyelonephritis, or kidney stone. The patient improved in the ED, is tolerating PO, and feels comfortable with discharge home.     DIFFERENTIAL: The patient was evaluated for different concerning etiologies of acute abdominal pain including: toxic injection, hepatitis, pancreatitis, biliary colic/cholecystitis, small bowel obstruction, perforated viscus, colitis, acute abdomen, AAA or genital urinary complaints. After evaluation the patient did not show evidence of the more concerning above listed conditions.    Final diagnoses:   Splenomegaly   LUQ abdominal pain       ED Disposition  ED Disposition     ED Disposition Condition Comment    Discharge Stable           Max Geiger MD  [address] [place] [id number]  [phone]    Schedule an appointment as soon as possible for a visit in 2 days      [facility] Emergency Department  [address] [place] [address]  [id number] [phone]    As needed         Medication List      New Prescriptions    promethazine 12.5 MG tablet  Commonly known as: PHENERGAN  Take 1 tablet by mouth Every 6 (Six) Hours As Needed for Nausea or Vomiting.           Where to Get Your Medications      These medications were sent to [id number] - [place], [place] - [address] AT [address] [facility] [phone] [facility] [phone] FX  [address], [facility], [place], operated by [facility] [address]    Phone: [phone]   · promethazine 12.5 MG tablet          Favio Ruiz PA-C  09/05/21 [id number]

## 2021-09-05 NOTE — TELEPHONE ENCOUNTER
"Abdominal pain with nausea. Constipation. metallic taste in mouth. Symptoms for a week, had covid 3 weeks ago    Reason for Disposition  • [1] MILD-MODERATE pain AND [2] constant AND [3] present > 2 hours    Additional Information  • Negative: Shock suspected (e.g., cold/pale/clammy skin, too weak to stand, low BP, rapid pulse)  • Negative: Difficult to awaken or acting confused (e.g., disoriented, slurred speech)  • Negative: Passed out (i.e., lost consciousness, collapsed and was not responding)  • Negative: Sounds like a life-threatening emergency to the triager  • Negative: Chest pain  • Negative: Pain is mainly in upper abdomen  (if needed ask: \"is it mainly above the belly button?\")  • Negative: Followed an abdomen (stomach) injury  • Negative: [1] SEVERE pain (e.g., excruciating) AND [2] present > 1 hour  • Negative: [1] SEVERE pain AND [2] age > 60 years  • Negative: [1] Vomiting AND [2] contains red blood or black (\"coffee ground\") material  (Exception: few red streaks in vomit that only happened once)  • Negative: Blood in bowel movements  (Exception: Blood on surface of BM with constipation)  • Negative: Black or tarry bowel movements (Exception: chronic-unchanged black-grey bowel movements AND is taking iron pills or Pepto-bismol)  • Negative: [1] Unable to urinate (or only a few drops) > 4 hours AND [2] bladder feels very full (e.g., palpable bladder or strong urge to urinate)  • Negative: [1] Pain in the scrotum or testicle AND [2] present > 1 hour  • Negative: Patient sounds very sick or weak to the triager    Answer Assessment - Initial Assessment Questions  1. LOCATION: \"Where does it hurt?\"     Left side pain  2. RADIATION: \"Does the pain shoot anywhere else?\" (e.g., chest, back)      none  3. ONSET: \"When did the pain begin?\" (Minutes, hours or days ago)        One week  4. SUDDEN: \"Gradual or sudden onset?\"      sudden  5. PATTERN \"Does the pain come and go, or is it constant?\"     - If constant: " "\"Is it getting better, staying the same, or worsening?\"       (Note: Constant means the pain never goes away completely; most serious pain is constant and it progresses)      - If intermittent: \"How long does it last?\" \"Do you have pain now?\"      (Note: Intermittent means the pain goes away completely between bouts)      constant  6. SEVERITY: \"How bad is the pain?\"  (e.g., Scale 1-10; mild, moderate, or severe)     - MILD (1-3): doesn't interfere with normal activities, abdomen soft and not tender to touch      - MODERATE (4-7): interferes with normal activities or awakens from sleep, tender to touch      - SEVERE (8-10): excruciating pain, doubled over, unable to do any normal activities        5-6  7. RECURRENT SYMPTOM: \"Have you ever had this type of stomach pain before?\" If Yes, ask: \"When was the last time?\" and \"What happened that time?\"      no  8. CAUSE: \"What do you think is causing the stomach pain?\"      unsure  9. RELIEVING/AGGRAVATING FACTORS: \"What makes it better or worse?\" (e.g., movement, antacids, bowel movement)      today  10. OTHER SYMPTOMS: \"Has there been any vomiting, diarrhea, constipation, or urine problems?\"        Nausea with vomiting, metallic taste and right sided abdominal pain    Protocols used: ABDOMINAL PAIN - MALE-ADULT-AH      "

## 2021-09-05 NOTE — DISCHARGE INSTRUCTIONS
Today your spleen is enlarged. Please continue to have close follow up with your family doctor for continued monitoring and evaluation.   Please use the zofran you have as a first line for nausea and if that does not work you can use the phenergan.   Please follow up with your primary care provider for a reevaluation in the next 24-48 hours.   Should you develop any new or worsening symptoms please return to the ED for further evaluation.         Enlarged Spleen    An enlarged spleen (splenomegaly) is when the spleen is larger than normal. The spleen is an organ that is located in the upper left area of the abdomen, just under the ribs. The spleen is like a storage unit for red blood cells, and it also works to filter and clean the blood. It destroys cells that are damaged or worn out. The spleen is also important for fighting disease.  This condition is usually noticed when the spleen is almost twice its normal size. An enlarged spleen is usually a sign of another health problem.  What are the causes?  This condition may be caused by:  · Mononucleosis and other viral infections.  · Infection with certain bacteria or parasites.  · Liver failure (cirrhosis) and other liver diseases.  · Blood diseases, such as hemolytic anemia.  · Blood cancers, such as leukemia or Hodgkin's disease.  Other causes include:  · Tumors and fluid-filled sacs (cysts).  · Metabolic disorders, such as Gaucher disease or Austin-Pick disease.  · Pressure or blood clots in the veins of the spleen.  · Connective tissue disorders, such as lupus or rheumatoid arthritis.  What are the signs or symptoms?  Symptoms of this condition include:  · Pain in the upper left part of the abdomen. The pain may spread to the left shoulder or get worse when you take a breath.  · Feeling full without eating or after eating only a small amount.  · Feeling tired.  · Chronic infections.  · Bleeding or bruising easily.  In some cases, there are no symptoms.  How is  this diagnosed?  This condition is diagnosed based on:  · A physical exam. Your health care provider will feel the left upper part of your abdomen.  · Tests, such as:  ? Blood tests to check red and white blood cells and other proteins and enzymes.  ? A biopsy. During a biopsy, a tissue sample of the liver or bone marrow may be removed and looked at under a microscope. A biopsy may be done if there is concern that the liver or bone marrow is causing the enlarged spleen.  ? An abdominal ultrasound.  ? A CT scan.  ? An MRI.  How is this treated?  Treatment for this condition depends on the cause. Treatment aims to:  · Manage the conditions that cause enlargement of the spleen.  · Reduce the size of the spleen.  Treatment may include:  · Medicines to treat infection or disease.  · Radiation therapy.  · Blood transfusions.  If these treatments do not help or if the cause cannot be found, surgery to remove the spleen (splenectomy) may be recommended. After surgery, you may need to have vaccinations or take antibiotics to prevent infections.  Follow these instructions at home:  Medicines  · Take over-the-counter and prescription medicines only as told by your health care provider.  · If you were prescribed an antibiotic medicine, take it as told by your health care provider. Do not stop taking the antibiotic even if you start to feel better.  · Talk with your health care provider about whether you need vaccinations to help prevent infections. This may be needed if treatment included surgery to remove the spleen. Not having a spleen makes certain infections more dangerous because it weakens the body's disease-fighting system (immune system).  General instructions  · Follow instructions from your health care provider about limiting your activities. To avoid injury or a ruptured spleen, make sure you:  ? Avoid contact sports.  ? Wear a seat belt in the car.  · Keep all follow-up visits as told by your health care provider.  This is important.  Contact a health care provider if:  · Your symptoms do not improve as expected.  · You have a fever or chills.  · You feel generally ill.  · You have increased pain when you take in a breath.  Get help right away if you:  · Experience an injury or impact to the spleen area.  · Have abdominal pain that becomes severe.  · Feel dizzy or you faint.  · Feel very weak.  · Have cold and clammy skin.  · Have sweating for no reason.  · Have chest pain or difficulty breathing.  Summary  · An enlarged spleen (splenomegaly) is when the spleen is larger than normal.  · An enlarged spleen is usually a sign of another health problem.  · This condition is treated with medicines, radiation therapy, blood transfusions, vaccines, or surgery.  This information is not intended to replace advice given to you by your health care provider. Make sure you discuss any questions you have with your health care provider.  Document Revised: 01/16/2020 Document Reviewed: 11/05/2019  [employer] Patient Education © 2021 [employer]        Abdominal Pain, Adult  Pain in the abdomen (abdominal pain) can be caused by many things. Often, abdominal pain is not serious and it gets better with no treatment or by being treated at home. However, sometimes abdominal pain is serious.  Your health care provider will ask questions about your medical history and do a physical exam to try to determine the cause of your abdominal pain.  Follow these instructions at home:  Medicines  · Take over-the-counter and prescription medicines only as told by your health care provider.  · Do not take a laxative unless told by your health care provider.  General instructions    · Watch your condition for any changes.  · Drink enough fluid to keep your urine pale yellow.  · Keep all follow-up visits as told by your health care provider. This is important.  Contact a health care provider if:  · Your abdominal pain changes or gets worse.  · You are not hungry  or you lose weight without trying.  · You are constipated or have diarrhea for more than 2-3 days.  · You have pain when you urinate or have a bowel movement.  · Your abdominal pain wakes you up at night.  · Your pain gets worse with meals, after eating, or with certain foods.  · You are vomiting and cannot keep anything down.  · You have a fever.  · You have blood in your urine.  Get help right away if:  · Your pain does not go away as soon as your health care provider told you to expect.  · You cannot stop vomiting.  · Your pain is only in areas of the abdomen, such as the right side or the left lower portion of the abdomen. Pain on the right side could be caused by appendicitis.  · You have bloody or black stools, or stools that look like tar.  · You have severe pain, cramping, or bloating in your abdomen.  · You have signs of dehydration, such as:  ? Dark urine, very little urine, or no urine.  ? Cracked lips.  ? Dry mouth.  ? Sunken eyes.  ? Sleepiness.  ? Weakness.  · You have trouble breathing or chest pain.  Summary  · Often, abdominal pain is not serious and it gets better with no treatment or by being treated at home. However, sometimes abdominal pain is serious.  · Watch your condition for any changes.  · Take over-the-counter and prescription medicines only as told by your health care provider.  · Contact a health care provider if your abdominal pain changes or gets worse.  · Get help right away if you have severe pain, cramping, or bloating in your abdomen.  This information is not intended to replace advice given to you by your health care provider. Make sure you discuss any questions you have with your health care provider.  Document Revised: 02/05/2021 Document Reviewed: 04/27/2020  [employer] Patient Education © 2021 [employer]

## 2021-09-10 LAB
BACTERIA SPEC AEROBE CULT: NORMAL
BACTERIA SPEC AEROBE CULT: NORMAL